# Patient Record
Sex: FEMALE | Race: BLACK OR AFRICAN AMERICAN | NOT HISPANIC OR LATINO
[De-identification: names, ages, dates, MRNs, and addresses within clinical notes are randomized per-mention and may not be internally consistent; named-entity substitution may affect disease eponyms.]

---

## 2019-06-17 PROBLEM — Z00.00 ENCOUNTER FOR PREVENTIVE HEALTH EXAMINATION: Status: ACTIVE | Noted: 2019-06-17

## 2019-06-20 ENCOUNTER — APPOINTMENT (OUTPATIENT)
Dept: ORTHOPEDIC SURGERY | Facility: CLINIC | Age: 54
End: 2019-06-20
Payer: COMMERCIAL

## 2019-06-20 VITALS — HEIGHT: 61 IN | BODY MASS INDEX: 39.65 KG/M2 | WEIGHT: 210 LBS

## 2019-06-20 PROCEDURE — 20610 DRAIN/INJ JOINT/BURSA W/O US: CPT | Mod: RT

## 2019-06-20 PROCEDURE — 99213 OFFICE O/P EST LOW 20 MIN: CPT | Mod: 25

## 2019-06-20 PROCEDURE — 73562 X-RAY EXAM OF KNEE 3: CPT | Mod: RT

## 2019-06-20 NOTE — HISTORY OF PRESENT ILLNESS
[de-identified] : Location: right knee\par Quality: aching\par Duration:06/4/2019\par Context: twisted knee , felt a pop\par Aggravating Factors: bending, stairs \par Conservative treatment: acebandage, ice, otc\par Associated Symptoms: twisting/tturning , popping \par Prior Studies:n.a\par

## 2019-06-20 NOTE — PHYSICAL EXAM
[de-identified] : Right knee shows no warmth there is mild swelling there is tenderness along the medial joint line. There is a negative Coleman test. No instability there is crepitus range of motion full extension and 90° of flexion. Neurovascular exam is normal [de-identified] : Right knee x-ray shows degenerative arthritis of the moderate degree

## 2019-06-20 NOTE — PROCEDURE
[de-identified] : Right knee cortisone injection was given with 40 mg of Kenalog and 3 cc 1% lidocaine

## 2019-06-20 NOTE — REVIEW OF SYSTEMS
[Arthralgia] : arthralgia [Joint Stiffness] : joint stiffness [Joint Pain] : joint pain [Joint Swelling] : joint swelling [Negative] : Psychiatric

## 2019-06-20 NOTE — DISCUSSION/SUMMARY
[Medication Risks Reviewed] : Medication risks reviewed [de-identified] : Injection was given into the right knee. She will also be placed on anti-inflammatory follow up in 2 weeks.

## 2019-06-24 ENCOUNTER — RX RENEWAL (OUTPATIENT)
Age: 54
End: 2019-06-24

## 2019-07-03 ENCOUNTER — APPOINTMENT (OUTPATIENT)
Dept: ORTHOPEDIC SURGERY | Facility: CLINIC | Age: 54
End: 2019-07-03

## 2019-07-10 ENCOUNTER — APPOINTMENT (OUTPATIENT)
Dept: ORTHOPEDIC SURGERY | Facility: CLINIC | Age: 54
End: 2019-07-10
Payer: MEDICAID

## 2019-07-10 PROCEDURE — 20610 DRAIN/INJ JOINT/BURSA W/O US: CPT | Mod: RT

## 2019-07-10 PROCEDURE — 99213 OFFICE O/P EST LOW 20 MIN: CPT | Mod: 25

## 2019-07-10 NOTE — DISCUSSION/SUMMARY
[de-identified] : Injection was given in the right knee. She will follow up in one month. She is better than she was.

## 2019-07-10 NOTE — PHYSICAL EXAM
[de-identified] : Right knee shows no warmth or swelling there is crepitus on range of motion his patellofemoral tenderness and mild medial lateral joint line tenderness. Negative Coleman neurovascular exam is normal

## 2019-07-10 NOTE — HISTORY OF PRESENT ILLNESS
[de-identified] : Right knee pain is better than it was. There is pain with physical activity. The anti-inflammatory helps. No buckling or locking.

## 2019-08-12 ENCOUNTER — APPOINTMENT (OUTPATIENT)
Dept: ORTHOPEDIC SURGERY | Facility: CLINIC | Age: 54
End: 2019-08-12

## 2019-09-20 ENCOUNTER — APPOINTMENT (OUTPATIENT)
Dept: ORTHOPEDIC SURGERY | Facility: CLINIC | Age: 54
End: 2019-09-20
Payer: COMMERCIAL

## 2019-09-20 PROCEDURE — 99213 OFFICE O/P EST LOW 20 MIN: CPT | Mod: 25

## 2019-09-20 PROCEDURE — 20610 DRAIN/INJ JOINT/BURSA W/O US: CPT | Mod: RT

## 2019-09-20 NOTE — DISCUSSION/SUMMARY
[de-identified] : Injection was given into the right knee. An effort of an MRI to make sure she doesn't have a meniscus tear also. In order gel injection also. Follow up by telephone with the MRI results in a week to gel and

## 2019-09-20 NOTE — HISTORY OF PRESENT ILLNESS
[de-identified] : Followup of her right knee. Injection lasted for a month. She elected to do it again. No buckling some giving way.

## 2019-09-20 NOTE — PHYSICAL EXAM
[LE] : Sensory: Intact in bilateral lower extremities [Normal RLE] : Right Lower Extremity: No scars, rashes, lesions, ulcers, skin intact [Normal Touch] : sensation intact for touch [Normal] : No swelling, no edema, normal pedal pulses and normal temperature [de-identified] : Right knee shows no warmth mild swelling there is varus stress crepitus there is joint line tenderness medially and laterally possible Coleman no instability neurovascular exam is

## 2019-12-23 ENCOUNTER — APPOINTMENT (OUTPATIENT)
Dept: ORTHOPEDIC SURGERY | Facility: CLINIC | Age: 54
End: 2019-12-23

## 2020-01-09 ENCOUNTER — APPOINTMENT (OUTPATIENT)
Dept: ORTHOPEDIC SURGERY | Facility: CLINIC | Age: 55
End: 2020-01-09
Payer: COMMERCIAL

## 2020-01-09 PROCEDURE — 99213 OFFICE O/P EST LOW 20 MIN: CPT

## 2020-01-09 NOTE — DISCUSSION/SUMMARY
[de-identified] : She will iceA neworder gel one for the right knee. The patient on an anti-inflammatory follow up will be in one month appear

## 2020-01-09 NOTE — PHYSICAL EXAM
[LE] : Sensory: Intact in bilateral lower extremities [Normal Touch] : sensation intact for touch [Normal RLE] : Right Lower Extremity: No scars, rashes, lesions, ulcers, skin intact [Normal] : No swelling, no edema, normal pedal pulses and normal temperature [de-identified] : Right knee shows no warmth mild swelling there is varus stress crepitus there is joint line tenderness medially and laterally possible Coleman no instability neurovascular exam is

## 2020-01-09 NOTE — HISTORY OF PRESENT ILLNESS
[Pain Location] : pain [Stable] : stable [de-identified] : She is here for followup. She did have a recent slip and fall about a month ago. The pain has improved slightly.

## 2020-02-06 ENCOUNTER — APPOINTMENT (OUTPATIENT)
Dept: ORTHOPEDIC SURGERY | Facility: CLINIC | Age: 55
End: 2020-02-06

## 2020-02-12 ENCOUNTER — APPOINTMENT (OUTPATIENT)
Dept: ORTHOPEDIC SURGERY | Facility: CLINIC | Age: 55
End: 2020-02-12
Payer: COMMERCIAL

## 2020-02-12 PROCEDURE — 20610 DRAIN/INJ JOINT/BURSA W/O US: CPT | Mod: RT

## 2020-02-12 PROCEDURE — 99213 OFFICE O/P EST LOW 20 MIN: CPT | Mod: 25

## 2020-02-12 NOTE — PHYSICAL EXAM
[LE] : Sensory: Intact in bilateral lower extremities [Normal RLE] : Right Lower Extremity: No scars, rashes, lesions, ulcers, skin intact [Normal Touch] : sensation intact for touch [Normal] : No swelling, no edema, normal pedal pulses and normal temperature [de-identified] : \par Right knee shows no warmth or swelling. There is crepitus on range of motion. There is joint line tenderness. There is no evidence of instability. Neurovascular exam is normal.

## 2020-02-12 NOTE — HISTORY OF PRESENT ILLNESS
[de-identified] : She is here for followup. she has recovered somewhat from her slip and fall injury. She is still working. We are awaiting the gel injection. the pain is somewhat manageable. [Pain Location] : pain [Stable] : stable

## 2020-02-12 NOTE — DISCUSSION/SUMMARY
[Medication Risks Reviewed] : Medication risks reviewed [de-identified] : Right knee cortisone injection given today. We will await the gel injection. I would try a different anti-inflammatory.

## 2020-06-17 ENCOUNTER — APPOINTMENT (OUTPATIENT)
Dept: ORTHOPEDIC SURGERY | Facility: CLINIC | Age: 55
End: 2020-06-17
Payer: COMMERCIAL

## 2020-06-17 VITALS — TEMPERATURE: 96 F

## 2020-06-17 PROCEDURE — 99213 OFFICE O/P EST LOW 20 MIN: CPT | Mod: 25

## 2020-06-17 PROCEDURE — 20610 DRAIN/INJ JOINT/BURSA W/O US: CPT | Mod: RT

## 2020-06-17 NOTE — PHYSICAL EXAM
[LE] : 5/5 motor strength in bilateral lower extremities [Normal Touch] : sensation intact for touch [Normal RLE] : Right Lower Extremity: No scars, rashes, lesions, ulcers, skin intact [Normal] : No swelling, no edema, normal pedal pulses and normal temperature [de-identified] : \par Right knee shows no warmth or swelling. There is crepitus on range of motion. There is joint line tenderness. There is no evidence of instability. Neurovascular exam is normal.

## 2020-06-17 NOTE — HISTORY OF PRESENT ILLNESS
[Pain Location] : pain [Stable] : stable [de-identified] : She was last seen in February. She had a cortisone injection it helped for a while. She took some naproxen. She would like to do it again. We still have not received the gel one injection

## 2020-10-19 ENCOUNTER — APPOINTMENT (OUTPATIENT)
Dept: ORTHOPEDIC SURGERY | Facility: CLINIC | Age: 55
End: 2020-10-19

## 2020-10-19 VITALS — TEMPERATURE: 95.1 F

## 2020-11-18 ENCOUNTER — APPOINTMENT (OUTPATIENT)
Dept: ORTHOPEDIC SURGERY | Facility: CLINIC | Age: 55
End: 2020-11-18
Payer: COMMERCIAL

## 2020-11-18 PROCEDURE — 99213 OFFICE O/P EST LOW 20 MIN: CPT | Mod: 25

## 2020-11-18 PROCEDURE — 20610 DRAIN/INJ JOINT/BURSA W/O US: CPT | Mod: RT

## 2020-11-18 NOTE — PHYSICAL EXAM
[LE] : Sensory: Intact in bilateral lower extremities [Normal RLE] : Right Lower Extremity: No scars, rashes, lesions, ulcers, skin intact [Normal Touch] : sensation intact for touch [Normal] : No swelling, no edema, normal pedal pulses and normal temperature [de-identified] : \par Right knee shows no warmth or swelling. There is crepitus on range of motion. There is joint line tenderness. There is no evidence of instability. Neurovascular exam is normal.

## 2020-11-18 NOTE — HISTORY OF PRESENT ILLNESS
[de-identified] : She was last seen in June. She had a cortisone injection it helped for a while. She took some naproxen. She would like to do it again. We still have not received the gel one injection and she will have to call the insurance to release it. [Pain Location] : pain [Stable] : stable

## 2020-11-30 ENCOUNTER — APPOINTMENT (OUTPATIENT)
Dept: ORTHOPEDIC SURGERY | Facility: CLINIC | Age: 55
End: 2020-11-30

## 2020-12-04 ENCOUNTER — APPOINTMENT (OUTPATIENT)
Dept: ORTHOPEDIC SURGERY | Facility: CLINIC | Age: 55
End: 2020-12-04
Payer: COMMERCIAL

## 2020-12-04 VITALS — TEMPERATURE: 95.7 F

## 2020-12-04 PROCEDURE — 20611 DRAIN/INJ JOINT/BURSA W/US: CPT | Mod: RT

## 2020-12-04 PROCEDURE — 99072 ADDL SUPL MATRL&STAF TM PHE: CPT

## 2020-12-04 PROCEDURE — 99214 OFFICE O/P EST MOD 30 MIN: CPT | Mod: 25

## 2020-12-04 NOTE — PROCEDURE
[de-identified] : Injection GEL ONE\par Patient was given an GEL ONE injection in the lateral compartment of the knee. Injection is performed under sterile conditions with ultrasound guidance. Patient tolerated procedure well. \par

## 2020-12-04 NOTE — HISTORY OF PRESENT ILLNESS
[de-identified] : pt is here for gel - one injection in the right knee. pt has done well with injections in the past

## 2020-12-04 NOTE — PHYSICAL EXAM
[de-identified] : \par Right knee shows no warmth or swelling. There is crepitus on range of motion. There is joint line tenderness. There is no evidence of instability. Neurovascular exam is normal. \par Musculoskeletal: Gait: normal. \par 5/5 motor strength in bilateral lower extremities. Sensory: Intact in bilateral lower extremities. \par Integumentary: Right Lower Extremity: No scars, rashes, lesions, ulcers, skin intact. \par Neurological: sensation intact for touch. \par Cardiovascular: No swelling, no edema, normal pedal pulses and normal temperature. \par  \par

## 2021-03-25 ENCOUNTER — APPOINTMENT (OUTPATIENT)
Dept: ORTHOPEDIC SURGERY | Facility: CLINIC | Age: 56
End: 2021-03-25
Payer: COMMERCIAL

## 2021-03-25 VITALS — HEIGHT: 61 IN | BODY MASS INDEX: 39.65 KG/M2 | WEIGHT: 210 LBS

## 2021-03-25 PROCEDURE — 99072 ADDL SUPL MATRL&STAF TM PHE: CPT

## 2021-03-25 PROCEDURE — 99213 OFFICE O/P EST LOW 20 MIN: CPT | Mod: 25

## 2021-03-25 PROCEDURE — 20610 DRAIN/INJ JOINT/BURSA W/O US: CPT | Mod: LT

## 2021-03-25 NOTE — PHYSICAL EXAM
[de-identified] : Left knee shows no obvious warmth or swelling. There is tenderness along the medial joint line. There is crepitus. There is no instability negative Coleman's neurovascular exam is normal

## 2021-03-25 NOTE — DISCUSSION/SUMMARY
[de-identified] : Cortisone injection given today no left knee. Postinjection instructions given. She would like to order gel injection for the left knee. We'll place the water. We have discussed this extensively. Followup will be after getting the medication

## 2021-03-25 NOTE — HISTORY OF PRESENT ILLNESS
[de-identified] : She is here for her left knee. She got relief with the right knee she had a gel injection. His pain going up and down stairs. No buckling or locking with the left knee [Pain Location] : pain [Worsening] : worsening

## 2021-06-24 ENCOUNTER — APPOINTMENT (OUTPATIENT)
Dept: ORTHOPEDIC SURGERY | Facility: CLINIC | Age: 56
End: 2021-06-24
Payer: COMMERCIAL

## 2021-06-24 VITALS — WEIGHT: 210 LBS | HEIGHT: 61 IN | BODY MASS INDEX: 39.65 KG/M2

## 2021-06-24 PROCEDURE — 99213 OFFICE O/P EST LOW 20 MIN: CPT | Mod: 25

## 2021-06-24 PROCEDURE — 20610 DRAIN/INJ JOINT/BURSA W/O US: CPT | Mod: 50

## 2021-06-24 NOTE — PHYSICAL EXAM
[de-identified] : Left knee shows no obvious warmth or swelling. There is tenderness along the medial joint line. There is crepitus. There is no instability negative Coleman's neurovascular exam is normal\par right knee shows no warmth or swelling. There is tenderness along the joint line. There is crepitus on range of motion. There is no evidence of instability. Neurovascular exam is normal. Negative Coleman.

## 2021-06-24 NOTE — DISCUSSION/SUMMARY
[Medication Risks Reviewed] : Medication risks reviewed [de-identified] : I have discussed shoe options. She would like cortisone injection. This was given. Postinjection instructions given. Followup will be as needed. She does not want a knee replacement at this time.

## 2021-06-24 NOTE — HISTORY OF PRESENT ILLNESS
[de-identified] : She is here for followup of both knees. She has osteoarthritis. She gets relief with the injection. She would like to do a new injection. She does not want to have total knee replacement done. [Pain Location] : pain [Stable] : stable

## 2021-06-24 NOTE — PROCEDURE
[de-identified] : After discussion of the risks and benefits, the patient has elected to proceed with an injection. Confirmed that the patient does not have a history of prior adverse reactions, active infections are relevant allergies. There was no erythema, warmth and the skin was clear. The skin was sterilized with alcohol. Ethyl chloride was used as a topical anesthetic. A needle was inserted. The site was injected with a mixture of Kenalog and local anesthetic. The injection was completed without complication and a bandage was applied. The patient tolerated the procedure well and was given post injection instructions.\par \par Medications:bilateral knee Kenalog 40 mg and 3 cc 1% lidocaine was given

## 2021-07-01 ENCOUNTER — APPOINTMENT (OUTPATIENT)
Dept: ORTHOPEDIC SURGERY | Facility: CLINIC | Age: 56
End: 2021-07-01

## 2022-04-13 ENCOUNTER — APPOINTMENT (OUTPATIENT)
Dept: ORTHOPEDIC SURGERY | Facility: CLINIC | Age: 57
End: 2022-04-13
Payer: COMMERCIAL

## 2022-04-13 PROCEDURE — 99214 OFFICE O/P EST MOD 30 MIN: CPT

## 2022-04-13 NOTE — PHYSICAL EXAM
[de-identified] : Physical Exam:\par \par General: patient is well developed, well nourished, in no acute \par distress, alert and oriented x 3. \par \par Mood and affect: normal\par \par Respiratory: no respiratory distress noted\par \par Skin: no scars over spine, skin intact, no erythema, increased warmth\par \par Alignment:The spine is well compensated in the coronal and sagittal plane.  \par \par Gait: The patient is able to toe walk and heel walk without difficulty. The patient is able to tandem gait without difficutly.\par \par Palpation: no tenderness to palpation spine or paraspinal region\par \par Range of motion: Lumbar spine ROM is restricted\par \par Neurologic Exam:\par Motor: Manual Muscle testing in the lower extremities is 5 out of 5 in all muscle groups. There is no evidence of muscular atrophy in the lower extremities. Sensory: Sensation to light touch is grossly intact in the lower extremities\par \par Reflexes: DTR are present and symmetric throughout, no clonus, plantar responses are flexor\par \par Hip Exam: No pain with internal or external rotation of hips bilaterally\par \par Special tests: Straight leg raise test negative.  Cross straight leg test negative.  PETER test negative\par \par Vascular: Examination of the peripheral vascular system demonstrates no evidence of congestion or edema. no evidence of lymphedema bilateral lower extremities, pulses are present and symmetric in both lower extremities. [de-identified] : XRay 4/13/22: L4-S1 moderate disc degeneration; no fractures; no instability; no significant scoliosis

## 2022-04-13 NOTE — HISTORY OF PRESENT ILLNESS
[de-identified] : 4-5 month history of lower back, non radiating, severe intensity\par treated with 2 months pt with mild relief\par tried naprosyn 500mg with mild relief, rx given by her MD\par current pain level 7/10\par non radiating, no numbness/tingling in lower extremities.  no weakness in the lower extremities\par no bladder/bowel symptoms\par no history of unexplained weight loss, no history of fevers/chills.\par \par ROS negative

## 2022-05-04 ENCOUNTER — APPOINTMENT (OUTPATIENT)
Dept: ORTHOPEDIC SURGERY | Facility: CLINIC | Age: 57
End: 2022-05-04

## 2022-05-05 RX ORDER — DIAZEPAM 5 MG/1
5 TABLET ORAL
Qty: 1 | Refills: 0 | Status: ACTIVE | COMMUNITY
Start: 2022-05-05 | End: 1900-01-01

## 2022-05-25 ENCOUNTER — APPOINTMENT (OUTPATIENT)
Dept: ORTHOPEDIC SURGERY | Facility: CLINIC | Age: 57
End: 2022-05-25
Payer: COMMERCIAL

## 2022-05-25 VITALS
DIASTOLIC BLOOD PRESSURE: 87 MMHG | HEART RATE: 69 BPM | OXYGEN SATURATION: 95 % | TEMPERATURE: 97.3 F | SYSTOLIC BLOOD PRESSURE: 144 MMHG

## 2022-05-25 PROCEDURE — 99214 OFFICE O/P EST MOD 30 MIN: CPT

## 2022-06-13 ENCOUNTER — APPOINTMENT (OUTPATIENT)
Dept: ORTHOPEDIC SURGERY | Facility: CLINIC | Age: 57
End: 2022-06-13
Payer: COMMERCIAL

## 2022-06-13 VITALS — WEIGHT: 242 LBS | BODY MASS INDEX: 45.69 KG/M2 | HEIGHT: 61 IN

## 2022-06-13 PROCEDURE — 99214 OFFICE O/P EST MOD 30 MIN: CPT | Mod: 25

## 2022-06-13 PROCEDURE — 20610 DRAIN/INJ JOINT/BURSA W/O US: CPT | Mod: 50

## 2022-06-13 RX ORDER — DICLOFENAC SODIUM 75 MG/1
75 TABLET, DELAYED RELEASE ORAL
Qty: 1 | Refills: 2 | Status: COMPLETED | COMMUNITY
Start: 2020-02-12 | End: 2022-06-13

## 2022-06-13 RX ORDER — ETODOLAC 500 MG/1
500 TABLET, FILM COATED ORAL TWICE DAILY
Qty: 60 | Refills: 4 | Status: COMPLETED | COMMUNITY
Start: 2020-01-09 | End: 2022-06-13

## 2022-06-13 RX ORDER — HYALURONATE SOD, CROSS-LINKED 30 MG/3 ML
30 SYRINGE (ML) INTRAARTICULAR
Qty: 1 | Refills: 0 | Status: COMPLETED | OUTPATIENT
Start: 2021-03-25 | End: 2022-06-13

## 2022-06-13 RX ORDER — HYALURONATE SOD, CROSS-LINKED 30 MG/3 ML
30 SYRINGE (ML) INTRAARTICULAR
Qty: 1 | Refills: 0 | Status: COMPLETED | OUTPATIENT
Start: 2020-06-17 | End: 2022-06-13

## 2022-06-13 RX ORDER — ETODOLAC 500 MG/1
500 TABLET, FILM COATED ORAL TWICE DAILY
Qty: 60 | Refills: 2 | Status: COMPLETED | COMMUNITY
Start: 2019-06-20 | End: 2022-06-13

## 2022-06-13 RX ORDER — ETODOLAC 500 MG/1
500 TABLET, FILM COATED ORAL TWICE DAILY
Qty: 60 | Refills: 2 | Status: COMPLETED | COMMUNITY
Start: 2019-06-24 | End: 2022-06-13

## 2022-06-13 RX ORDER — HYALURONATE SOD, CROSS-LINKED 30 MG/3 ML
30 SYRINGE (ML) INTRAARTICULAR
Qty: 1 | Refills: 0 | Status: COMPLETED | OUTPATIENT
Start: 2020-01-09 | End: 2022-06-13

## 2022-06-13 RX ORDER — HYALURONATE SOD, CROSS-LINKED 30 MG/3 ML
30 SYRINGE (ML) INTRAARTICULAR
Qty: 1 | Refills: 0 | Status: COMPLETED | OUTPATIENT
Start: 2019-09-20 | End: 2022-06-13

## 2022-06-13 RX ORDER — DIAZEPAM 5 MG/1
5 TABLET ORAL
Qty: 1 | Refills: 0 | Status: COMPLETED | COMMUNITY
Start: 2022-04-13 | End: 2022-06-13

## 2022-06-13 NOTE — PROCEDURE
[de-identified] : After discussion of the risks and benefits, the patient has elected to proceed with an injection. Confirmed that the patient does not have a history of prior adverse reactions, active infections are relevant allergies. There was no erythema, warmth and the skin was clear. The skin was sterilized with alcohol. Ethyl chloride was used as a topical anesthetic. A needle was inserted. The site was injected with a mixture of Kenalog and local anesthetic. The injection was completed without complication and a bandage was applied. The patient tolerated the procedure well and was given post injection instructions.\par \par Medications:bilateral knee Kenalog 40 mg and 3 cc 1% lidocaine was given

## 2022-06-13 NOTE — HISTORY OF PRESENT ILLNESS
[de-identified] : This patient is here for fractures bilateral knee osteoarthritis. She gets relief with cortisone injection. She doesn't want total knee replacement.

## 2022-06-13 NOTE — PHYSICAL EXAM
[de-identified] : Left knee shows no obvious warmth or swelling. There is tenderness along the medial joint line. There is crepitus. There is no instability negative Coleman's neurovascular exam is normal\par right knee shows no warmth or swelling. There is tenderness along the joint line. There is crepitus on range of motion. There is no evidence of instability. Neurovascular exam is normal. Negative Coleman.

## 2022-06-13 NOTE — DISCUSSION/SUMMARY
[de-identified] : Injection was given in both knees today. The patient tolerated. If the symptoms fail to improve she'll let me know. Total knee replacement discussed

## 2022-06-13 NOTE — REASON FOR VISIT
"Face to Face Note  -  Durable Medical Equipment    Ramsey Martin D.O. - NPI: [id number]  I certify that this patient is under my care and that they had a durable medical equipment(DME)face to face encounter by myself that meets the physician DME face-to-face encounter requirements with this patient on:    Date of encounter:   Patient:                    MRN:                       YOB: 2022  Florin Cox  [id number]  1980     The encounter with the patient was in whole, or in part, for the following medical condition, which is the primary reason for durable medical equipment:  Covid-19 Infection    I certify that, based on my findings, the following durable medical equipment is medically necessary:  Oxygen.    HOME O2 Saturation Measurements:(Values must be present for Home Oxygen orders)  Room air sat at rest: 91  Room air sat with amb: 90  With liters of O2: 3, O2 sat at rest with O2: 93  With Liters of O2: 3, O2 sat with amb with O2 : 93  Is the patient mobile?: Yes    My Clinical findings support the need for the above equipment due to:  Hypoxia    Supporting Symptoms: The patient requires supplemental oxygen, as the following interventions have been tried with limited or no improvement: \"Ambulation with oximetry    If patient feels more short of breath, they can go up to 6 liters per minute and contact healthcare provider.  " [Follow-Up Visit] : a follow-up visit for [Knee Pain] : knee pain [FreeTextEntry2] : bilateral knees dull pain and stiffness.

## 2022-06-14 NOTE — DISCUSSION/SUMMARY
[de-identified] : Patient was given a prescription and instructions to wear a TLSO brace at all times. She will take it off to bathe and sleep. She will follow up in six weeks with a new X-ray at that time. If she is not  significantly improved I would consider kyphoplasty at that time. If she is significantly improved I would recommend physical therapy at that time. At next visit she will obtain an AP lateral full spine X-ray standing. \par

## 2022-06-14 NOTE — ADDENDUM
[FreeTextEntry1] : I, Lorelei Montes, assisted with documentation on 05/25/2022 acting as scribe for Dr. Dave Berry.

## 2022-06-14 NOTE — HISTORY OF PRESENT ILLNESS
[de-identified] : Follow up 5/25/22: Claudette Morgan, here for follow up, continues to have significant back pain. This has not changed since her last visit. She had an MRI for lumbar spine to review. \par \par Initial 4/13/22: 4-5 month history of lower back, non radiating, severe intensity\par treated with 2 months pt with mild relief\par tried naprosyn 500mg with mild relief, rx given by her MD\par current pain level 7/10\par non radiating, no numbness/tingling in lower extremities.  no weakness in the lower extremities\par no bladder/bowel symptoms\par no history of unexplained weight loss, no history of fevers/chills.\par \par ROS negative

## 2022-06-14 NOTE — PHYSICAL EXAM
[de-identified] : Physical Exam:\par \par General: patient is well developed, well nourished, in no acute \par distress, alert and oriented x 3. \par \par Mood and affect: normal\par \par Respiratory: no respiratory distress noted\par \par Skin: no scars over spine, skin intact, no erythema, increased warmth\par \par Alignment:The spine is well compensated in the coronal and sagittal plane.  \par \par Gait: The patient is able to toe walk and heel walk without difficulty. The patient is able to tandem gait without difficutly.\par \par Palpation: no tenderness to palpation spine or paraspinal region\par \par Range of motion: Lumbar spine ROM is restricted\par \par Neurologic Exam:\par Motor: Manual Muscle testing in the lower extremities is 5 out of 5 in all muscle groups. There is no evidence of muscular atrophy in the lower extremities. Sensory: Sensation to light touch is grossly intact in the lower extremities\par \par Reflexes: DTR are present and symmetric throughout, no clonus, plantar responses are flexor\par \par Hip Exam: No pain with internal or external rotation of hips bilaterally\par \par Special tests: Straight leg raise test negative.  Cross straight leg test negative.  PETER test negative\par \par Vascular: Examination of the peripheral vascular system demonstrates no evidence of congestion or edema. no evidence of lymphedema bilateral lower extremities, pulses are present and symmetric in both lower extremities. [de-identified] : MRI lumbar spine [my read] 5/10/22: demonstrates acute compression fracture T12. There is also severe disc degenerative disease in L4/5. There is mild canal stenosis at that level. \par \par XRay 4/13/22: L4-S1 moderate disc degeneration; no fractures; no instability; no significant scoliosis

## 2022-06-24 NOTE — DISCUSSION/SUMMARY
[de-identified] : Chronic lower back pain after fall 5 months ago.  Moderate disc degeneration L4-S1 on xray.  failed PT course and Rx naprosyn course.  Recommend MRI lumbar spine non contrast.  valium 1 tab given for claustrophobia to be taken 1 hr before MRI.  follow up after MRI performed for review and determination of further plan.  all questions answered.
36.4 wk male born via CS to a 44 y/o  mother.  Maternal history of depression/anxiety. Prenatal history of GDM. Maternal labs include Blood Type O+, HIV - , RPR - , Rubella - , Hep B - , GBS unknown, not treated. COVID [PENDING]  AROM at delivery with clear fluids (ROM hours: 0). Baby emerged vigorous, crying, was w/d/s/s with APGARS of 9/9. Mom plans to initiate breastfeeding, consents Hep B vaccine and undecided on circ.  Highest maternal temp: 37, EOS n/a    Physical Exam:  Gen: no acute distress, +grimace  HEENT:  anterior fontanel open soft and flat, nondysmoprhic facies, no cleft lip/palate, ears normal set, no ear pits or tags, nares clinically patent  Resp: Normal respiratory effort without grunting or retractions, good air entry b/l, clear to auscultation bilaterally  Cardio: Present S1/S2, regular rate and rhythm, no murmurs  Abd: soft, non tender, non distended, umbilical cord with 3 vessels  Neuro: +palmar and plantar grasp, +suck, +tiki, normal tone  Extremities: negative shepherd and ortolani maneuvers, moving all extremities, no clavicular crepitus or stepoff  Skin: pink, warm  Genitals: Normal male anatomy, testicles palpable in scrotum b/l, Jj 1, anus patent

## 2022-07-06 ENCOUNTER — APPOINTMENT (OUTPATIENT)
Dept: ORTHOPEDIC SURGERY | Facility: CLINIC | Age: 57
End: 2022-07-06

## 2022-07-06 PROCEDURE — 99213 OFFICE O/P EST LOW 20 MIN: CPT

## 2022-07-06 PROCEDURE — 72082 X-RAY EXAM ENTIRE SPI 2/3 VW: CPT

## 2022-07-20 NOTE — ADDENDUM
[FreeTextEntry1] : I, Lorelei Montes, assisted with documentation on 07/06/2022 acting as scribe for Dr. Dave Berry.

## 2022-07-20 NOTE — PHYSICAL EXAM
[de-identified] : Physical Exam:\par \par General: patient is well developed, well nourished, in no acute \par distress, alert and oriented x 3. \par \par Mood and affect: normal\par \par Respiratory: no respiratory distress noted\par \par Skin: no scars over spine, skin intact, no erythema, increased warmth\par \par Alignment:The spine is well compensated in the coronal and sagittal plane.  \par \par Gait: The patient is able to toe walk and heel walk without difficulty. The patient is able to tandem gait without difficutly.\par \par Palpation: no tenderness to palpation spine or paraspinal region\par \par Range of motion: Lumbar spine ROM is deferred due to fracture\par \par Neurologic Exam:\par Motor: Manual Muscle testing in the lower extremities is 5 out of 5 in all muscle groups. There is no evidence of muscular atrophy in the lower extremities. Sensory: Sensation to light touch is grossly intact in the lower extremities\par \par Reflexes: DTR are present and symmetric throughout, no clonus, plantar responses are flexor\par \par Hip Exam: No pain with internal or external rotation of hips bilaterally\par \par Special tests: Straight leg raise test negative.  Cross straight leg test negative.  PETER test negative\par \par Vascular: Examination of the peripheral vascular system demonstrates no evidence of congestion or edema. no evidence of lymphedema bilateral lower extremities, pulses are present and symmetric in both lower extremities. [de-identified] : XR 7/6/22 (my read): T12 compression fracture, unchanged from 5/2022 mri\par \par MRI lumbar spine [my read] 5/10/22: demonstrates acute compression fracture T12. There is also severe disc degenerative disease in L4/5. There is mild canal stenosis at that level. \par \par XRay 4/13/22: L4-S1 moderate disc degeneration; no fractures; no instability; no significant scoliosis

## 2022-07-20 NOTE — DISCUSSION/SUMMARY
[de-identified] : Diagnosis: low back pain, improving; T12 compression fracture, stable on imaging today\par \par Discussed my findings with the patient. I am recommending a DEXA scan, order given. Will refer to endocrinology to optimize bone metabolism pending results of DEXA. Patient would also like to start physical therapy, ok given fracture now approximately 8 months old, referral given. Call office once DEXA has been completed for results. Follow up with me in 2-3 months, sooner if there is an issue. XR full spine at that time. All questions answered.

## 2022-07-20 NOTE — HISTORY OF PRESENT ILLNESS
[de-identified] : Follow up 7/6/22: Patient presents for follow up. She continues to have nonradiating mid and low back pain, mildly improved from last visit. Patient denies lower extremity numbness, weakness, or paresthesias. She was unable to obtain TLSO brace as her insurance did not cover and could not afford out-of-pocket cost. Denies new neurologic symptoms. Occasionally takes Naproxen for pain with relief. \par \par Follow up 5/25/22: Claudette Morgan, here for follow up, continues to have significant back pain. This has not changed since her last visit. She had an MRI for lumbar spine to review.\par \par Initial: 4-5 month history of lower back, non radiating, severe intensity\par treated with 2 months pt with mild relief\par tried naprosyn 500mg with mild relief, rx given by her MD\par current pain level 7/10\par non radiating, no numbness/tingling in lower extremities.  no weakness in the lower extremities\par no bladder/bowel symptoms\par no history of unexplained weight loss, no history of fevers/chills.\par \par ROS negative

## 2022-09-19 ENCOUNTER — APPOINTMENT (OUTPATIENT)
Dept: ORTHOPEDIC SURGERY | Facility: CLINIC | Age: 57
End: 2022-09-19

## 2022-09-19 DIAGNOSIS — M54.50 LOW BACK PAIN, UNSPECIFIED: ICD-10-CM

## 2022-09-19 DIAGNOSIS — G89.29 LOW BACK PAIN, UNSPECIFIED: ICD-10-CM

## 2022-09-19 PROCEDURE — 99213 OFFICE O/P EST LOW 20 MIN: CPT

## 2022-09-19 PROCEDURE — 72082 X-RAY EXAM ENTIRE SPI 2/3 VW: CPT

## 2022-09-19 RX ORDER — METOPROLOL SUCCINATE 25 MG/1
25 TABLET, EXTENDED RELEASE ORAL
Qty: 90 | Refills: 0 | Status: ACTIVE | COMMUNITY
Start: 2022-02-18

## 2022-09-19 RX ORDER — ROSUVASTATIN CALCIUM 10 MG/1
10 TABLET, FILM COATED ORAL
Qty: 30 | Refills: 0 | Status: ACTIVE | COMMUNITY
Start: 2022-05-03

## 2022-09-19 RX ORDER — AMLODIPINE BESYLATE 10 MG/1
10 TABLET ORAL
Qty: 90 | Refills: 0 | Status: ACTIVE | COMMUNITY
Start: 2022-05-03

## 2022-09-19 RX ORDER — FLUTICASONE FUROATE AND VILANTEROL TRIFENATATE 100; 25 UG/1; UG/1
100-25 POWDER RESPIRATORY (INHALATION)
Qty: 60 | Refills: 0 | Status: ACTIVE | COMMUNITY
Start: 2022-04-15

## 2022-09-19 RX ORDER — ALBUTEROL SULFATE 90 UG/1
108 (90 BASE) INHALANT RESPIRATORY (INHALATION)
Qty: 8 | Refills: 0 | Status: ACTIVE | COMMUNITY
Start: 2021-10-18

## 2022-09-19 RX ORDER — LOSARTAN POTASSIUM 50 MG/1
50 TABLET, FILM COATED ORAL
Qty: 30 | Refills: 0 | Status: ACTIVE | COMMUNITY
Start: 2022-07-11

## 2022-09-19 RX ORDER — AZELASTINE HYDROCHLORIDE 137 UG/1
0.1 SPRAY, METERED NASAL
Qty: 30 | Refills: 0 | Status: ACTIVE | COMMUNITY
Start: 2022-04-15

## 2022-09-19 RX ORDER — MONTELUKAST 10 MG/1
10 TABLET, FILM COATED ORAL
Qty: 30 | Refills: 0 | Status: ACTIVE | COMMUNITY
Start: 2022-07-11

## 2022-09-19 RX ORDER — NAPROXEN 500 MG/1
500 TABLET ORAL
Qty: 30 | Refills: 0 | Status: ACTIVE | COMMUNITY
Start: 2022-07-11

## 2022-09-19 NOTE — DISCUSSION/SUMMARY
[de-identified] : Diagnosis: low back pain, T12 compression fracture aprox 10 months ago, stable on imaging today\par \par Discussed my findings with the patient. I am again recommending a DEXA scan, order given. Will refer to endocrinology to optimize bone metabolism pending results of DEXA. Patient would also like to restart physical therapy, ok given fracture now approximately 10 months old, referral given. Call office once DEXA has been completed for results. Follow up with me in 3 months, sooner if there is an issue. XR full spine at that time. If still having significant pain would consider repeat MRI to ensure fracture healed.  All questions answered.

## 2022-09-19 NOTE — HISTORY OF PRESENT ILLNESS
[de-identified] : Follow up 9/19/22: here for follow up.  fell in july after visit again and had increased pain, now feels back to where she was at last visit.  some days will wake up with 8-10/10 low back pain, some days will wake up without pain.  feels pain intermittently throughout the day but not every day.  Was unable to do PT due to insurance reasons for past few months.  would like to start again as it made her feel better.  no radiation of pain, no numbness/tingling/weakness.\par \par Follow up 7/6/22: Patient presents for follow up. She continues to have nonradiating mid and low back pain, mildly improved from last visit. Patient denies lower extremity numbness, weakness, or paresthesias. She was unable to obtain TLSO brace as her insurance did not cover and could not afford out-of-pocket cost. Denies new neurologic symptoms. Occasionally takes Naproxen for pain with relief. \par \par Follow up 5/25/22: Claudette Morgan, here for follow up, continues to have significant back pain. This has not changed since her last visit. She had an MRI for lumbar spine to review.\par \par Initial: 4-5 month history of lower back, non radiating, severe intensity\par treated with 2 months pt with mild relief\par tried naprosyn 500mg with mild relief, rx given by her MD\par current pain level 7/10\par non radiating, no numbness/tingling in lower extremities. no weakness in the lower extremities\par no bladder/bowel symptoms\par no history of unexplained weight loss, no history of fevers/chills.\par \par ROS negative \par

## 2022-09-19 NOTE — PHYSICAL EXAM
[de-identified] : Physical Exam:\par \par General: patient is well developed, well nourished, in no acute \par distress, alert and oriented x 3. \par \par Mood and affect: normal\par \par Respiratory: no respiratory distress noted\par \par Skin: no scars over spine, skin intact, no erythema, increased warmth\par \par Alignment:The spine is well compensated in the coronal and sagittal plane.  \par \par Gait: The patient is able to toe walk and heel walk without difficulty. The patient is able to tandem gait without difficutly.\par \par Palpation: no tenderness to palpation spine or paraspinal region\par \par Range of motion: Lumbar spine ROM is restricted\par \par Neurologic Exam:\par Motor: Manual Muscle testing in the lower extremities is 5 out of 5 in all muscle groups. There is no evidence of muscular atrophy in the lower extremities. Sensory: Sensation to light touch is grossly intact in the lower extremities\par \par Reflexes: DTR are present and symmetric throughout, no clonus, plantar responses are flexor\par \par Hip Exam: No pain with internal or external rotation of hips bilaterally\par \par Special tests: Straight leg raise test negative.  Cross straight leg test negative.  PETER test negative\par \par Vascular: Examination of the peripheral vascular system demonstrates no evidence of congestion or edema. no evidence of lymphedema bilateral lower extremities, pulses are present and symmetric in both lower extremities. [de-identified] : XR 9/19/22 (my read) T12 compression fracture, unchanged\par \par XR 7/6/22 (my read): T12 compression fracture, unchanged from 5/2022 mri\par \par MRI lumbar spine [my read] 5/10/22: demonstrates acute compression fracture T12. There is also severe disc degenerative disease in L4/5. There is mild canal stenosis at that level. \par \par XRay 4/13/22: L4-S1 moderate disc degeneration; no fractures; no instability; no significant scoliosis.

## 2022-09-27 ENCOUNTER — APPOINTMENT (OUTPATIENT)
Dept: ORTHOPEDIC SURGERY | Facility: CLINIC | Age: 57
End: 2022-09-27

## 2022-10-04 ENCOUNTER — APPOINTMENT (OUTPATIENT)
Dept: RADIOLOGY | Facility: HOSPITAL | Age: 57
End: 2022-10-04

## 2022-10-04 ENCOUNTER — OUTPATIENT (OUTPATIENT)
Dept: OUTPATIENT SERVICES | Facility: HOSPITAL | Age: 57
LOS: 1 days | End: 2022-10-04
Payer: COMMERCIAL

## 2022-10-04 PROCEDURE — 77080 DXA BONE DENSITY AXIAL: CPT | Mod: 26

## 2022-10-04 PROCEDURE — 77080 DXA BONE DENSITY AXIAL: CPT

## 2022-10-05 ENCOUNTER — NON-APPOINTMENT (OUTPATIENT)
Age: 57
End: 2022-10-05

## 2022-11-10 ENCOUNTER — APPOINTMENT (OUTPATIENT)
Dept: ORTHOPEDIC SURGERY | Facility: CLINIC | Age: 57
End: 2022-11-10

## 2022-11-10 PROCEDURE — 20610 DRAIN/INJ JOINT/BURSA W/O US: CPT | Mod: 50

## 2022-11-10 NOTE — DISCUSSION/SUMMARY
[de-identified] : Injection was done today Kenalog into each knee postinjection instructions given follow up will be as needed.

## 2022-11-10 NOTE — HISTORY OF PRESENT ILLNESS
[de-identified] : This patient has bilateral knee osteoarthritis she's got relief with cortisone injections. She was seen quite some time ago. She would like to do some injections today. She did fall in July.

## 2022-11-10 NOTE — PHYSICAL EXAM
[de-identified] : Left knee shows no obvious warmth or swelling. There is tenderness along the medial joint line. There is crepitus. There is no instability negative Coleman's neurovascular exam is normal\par right knee shows no warmth or swelling. There is tenderness along the joint line. There is crepitus on range of motion. There is no evidence of instability. Neurovascular exam is normal. Negative Coleman.

## 2022-11-10 NOTE — PROCEDURE
[de-identified] : After discussion of the risks and benefits, the patient has elected to proceed with an injection. Confirmed that the patient does not have a history of prior adverse reactions, active infections are relevant allergies. There was no erythema, warmth and the skin was clear. The skin was sterilized with alcohol. Ethyl chloride was used as a topical anesthetic. A needle was inserted. The site was injected with a mixture of Kenalog and local anesthetic. The injection was completed without complication and a bandage was applied. The patient tolerated the procedure well and was given post injection instructions.\par \par Medications:bilateral knee Kenalog 10 mg and 3 cc 1% lidocaine was given

## 2022-11-14 RX ORDER — HYALURONATE SOD, CROSS-LINKED 30 MG/3 ML
30 SYRINGE (ML) INTRAARTICULAR
Qty: 1 | Refills: 0 | Status: ACTIVE | OUTPATIENT
Start: 2022-11-14

## 2022-12-28 ENCOUNTER — APPOINTMENT (OUTPATIENT)
Dept: ORTHOPEDIC SURGERY | Facility: CLINIC | Age: 57
End: 2022-12-28
Payer: COMMERCIAL

## 2022-12-28 VITALS
BODY MASS INDEX: 45.69 KG/M2 | DIASTOLIC BLOOD PRESSURE: 95 MMHG | SYSTOLIC BLOOD PRESSURE: 138 MMHG | WEIGHT: 242 LBS | HEIGHT: 61 IN | OXYGEN SATURATION: 97 % | HEART RATE: 93 BPM

## 2022-12-28 DIAGNOSIS — M51.36 OTHER INTERVERTEBRAL DISC DEGENERATION, LUMBAR REGION: ICD-10-CM

## 2022-12-28 PROCEDURE — 99213 OFFICE O/P EST LOW 20 MIN: CPT

## 2022-12-28 PROCEDURE — 72082 X-RAY EXAM ENTIRE SPI 2/3 VW: CPT

## 2023-01-09 NOTE — DISCUSSION/SUMMARY
[de-identified] : Diagnosis: Healed T12 fracture, L4-S1 disc degeneration, chronic mid and lower back pain.\par \par I have discussed my findings with the patient. I have recommended a course of physical therapy again. The patient would like to go now. She should follow up with me on an as needed basis.

## 2023-01-09 NOTE — PHYSICAL EXAM
[de-identified] : Physical Exam:\par \par General: patient is well developed, well nourished, in no acute \par distress, alert and oriented x 3. \par \par Mood and affect: normal\par \par Respiratory: no respiratory distress noted\par \par Skin: no scars over spine, skin intact, no erythema, increased warmth\par \par Alignment:The spine is well compensated in the coronal and sagittal plane.  \par \par Gait: The patient is able to toe walk and heel walk without difficulty. The patient is able to tandem gait without difficutly.\par \par Palpation: no tenderness to palpation spine or paraspinal region\par \par Range of motion: Lumbar spine ROM is restricted\par \par Neurologic Exam:\par Motor: Manual Muscle testing in the lower extremities is 5 out of 5 in all muscle groups. There is no evidence of muscular atrophy in the lower extremities. Sensory: Sensation to light touch is grossly intact in the lower extremities\par \par Reflexes: DTR are present and symmetric throughout, no clonus, plantar responses are flexor\par \par Hip Exam: No pain with internal or external rotation of hips bilaterally\par \par Special tests: Straight leg raise test negative.  Cross straight leg test negative.  PETER test negative\par \par Vascular: Examination of the peripheral vascular system demonstrates no evidence of congestion or edema. no evidence of lymphedema bilateral lower extremities, pulses are present and symmetric in both lower extremities. [de-identified] : XR Full Spine AP/Lateral 12/28/2022 [my read]: Healed T12 fracture with normal alignment in that region.\par \par XR 9/19/22 (my read) T12 compression fracture, unchanged\par \par XR 7/6/22 (my read): T12 compression fracture, unchanged from 5/2022 mri\par \par MRI lumbar spine [my read] 5/10/22: demonstrates acute compression fracture T12. There is also severe disc degenerative disease in L4/5. There is mild canal stenosis at that level. \par \par XRay 4/13/22: L4-S1 moderate disc degeneration; no fractures; no instability; no significant scoliosis.

## 2023-01-09 NOTE — HISTORY OF PRESENT ILLNESS
[de-identified] : Follow up 12/28/2022: Ms. Jc is here for follow up. She continues to have low back pain that is nonradiating. She did not go to physical therapy on her last visit due to insurance issues. She says those issues have been worked out and would like to start physical therapy.\par \par Follow up 9/19/22: here for follow up.  fell in july after visit again and had increased pain, now feels back to where she was at last visit.  some days will wake up with 8-10/10 low back pain, some days will wake up without pain.  feels pain intermittently throughout the day but not every day.  Was unable to do PT due to insurance reasons for past few months.  would like to start again as it made her feel better.  no radiation of pain, no numbness/tingling/weakness.\par \par Follow up 7/6/22: Patient presents for follow up. She continues to have nonradiating mid and low back pain, mildly improved from last visit. Patient denies lower extremity numbness, weakness, or paresthesias. She was unable to obtain TLSO brace as her insurance did not cover and could not afford out-of-pocket cost. Denies new neurologic symptoms. Occasionally takes Naproxen for pain with relief. \par \par Follow up 5/25/22: Claudette Morgan, here for follow up, continues to have significant back pain. This has not changed since her last visit. She had an MRI for lumbar spine to review.\par \par Initial: 4-5 month history of lower back, non radiating, severe intensity\par treated with 2 months pt with mild relief\par tried naprosyn 500mg with mild relief, rx given by her MD\par current pain level 7/10\par non radiating, no numbness/tingling in lower extremities. no weakness in the lower extremities\par no bladder/bowel symptoms\par no history of unexplained weight loss, no history of fevers/chills.\par \par ROS negative \par

## 2023-01-09 NOTE — END OF VISIT
[FreeTextEntry3] : All medical record entries made by the Scribe were at my, Dr. Dave Berry, direction and personally dictated by me on 12/28/2022. I have reviewed the chart and agree that the record accurately reflects my personal performance of the history, physical exam, assessment and plan. I have also personally directed, reviewed, and agreed with the chart.

## 2023-03-21 ENCOUNTER — APPOINTMENT (OUTPATIENT)
Dept: ORTHOPEDIC SURGERY | Facility: CLINIC | Age: 58
End: 2023-03-21
Payer: COMMERCIAL

## 2023-03-21 VITALS — BODY MASS INDEX: 39.65 KG/M2 | WEIGHT: 210 LBS | HEIGHT: 61 IN

## 2023-03-21 PROCEDURE — 99213 OFFICE O/P EST LOW 20 MIN: CPT | Mod: 25

## 2023-03-21 PROCEDURE — 20611 DRAIN/INJ JOINT/BURSA W/US: CPT | Mod: LT

## 2023-03-22 RX ORDER — HYALURONATE SODIUM, STABILIZED 88 MG/4 ML
88 SYRINGE (ML) INTRAARTICULAR
Qty: 1 | Refills: 0 | Status: ACTIVE | OUTPATIENT
Start: 2023-03-22

## 2023-03-22 NOTE — ASSESSMENT
[FreeTextEntry1] : Discussed at length with patient regarding symptoms and diagnosis.  Treatment options discussed and patient's questions answered and patient expresses understanding.  Patient elects MONOVISC to right knee and would like to order for the LEFT.  Patient instructed to call if any questions or concerns.\par

## 2023-03-22 NOTE — PROCEDURE
[de-identified] : After discussion of the risks and benefits, the patient elects Monovisc injection to the knee.  Confirmed that the patient does not have history of prior adverse reactions, active infections, or relevant allergies.  There was no effusion, erythema, or warmth, and the skin was clear.\par The skin was prepped in the usual sterile manner, ethyl chloride spray was used as a topical anesthetic.  A needle was inserted \par UTILIZING ULTRASOUND GUIDANCE TO LOCALIZE THE NEEDLE IN THE KNEE JOINT\par into the RIGHT KNEE via an anterolateral approach.  Viscosupplement was then slowly injected. The injection was completed without complication and a bandage was applied.\par The patient tolerated the procedure well and was given post-injection instructions: no exercise x 48 hours, cold packs and analgesics prn.\par

## 2023-03-22 NOTE — HISTORY OF PRESENT ILLNESS
[de-identified] : Initial Visit for B/L Knees Right Knee more than Left \par GEL Inj today \par LAURA from Dr. Kingsley - last visit 11/10/2022\par Hx of Cortisone Inj - not as helpful due to job duties standing for prolonged time outside\par Hx of Arthritis at B/L Knees\par Reason: Denies Injury / Trauma\par Duration: 5 years ago \par Aggrvt: Stair climbing, prolonged walking\par Allvt: Heat \par NKDA\par Pain Med: Naproxen (Prescribed by PCP)

## 2023-03-22 NOTE — PHYSICAL EXAM
[de-identified] : Bilateral knee\par \par Constitutional: \par The patient is healthy-appearing and in no apparent distress. \par \par Gait:\par The patient ambulates with a normal gait and no limp.\par \par Cardiovascular System: \par The capillary refill is less than 2 seconds. \par \par Skin: \par There are no skin abnormalities.\par \par Bilateral Knee:\par  \par Bony Palpation: \par There is no tenderness of the medial joint line. \par There is no tenderness of the lateral joint line.\par There is tenderness of the medial femoral chondyle.\par There is tenderness of the lateral femoral chondyle.\par There is no tenderness of the tibial tubercle.\par There is no tenderness of the superior patella.\par There is no tenderness of the inferior patella.\par There is tenderness of the medial patellar facet.\par There is tenderness of the lateral patellar facet.\par \par Soft Tissue Palpation: \par There is no tenderness of the medial retinaculum.\par There is no tenderness of the lateral retinaculum.\par There is no tenderness of the quadriceps tendon.\par There is no tenderness of the patella tendon.\par There is no tenderness of the ITB.\par There is no tenderness of the pes anserine.\par \par Active Range of Motion: \par The range of motion at the knee actively and passively is 3 - 125. \par \par Special Tests: \par There is a negative Apley.\par There is a negative Steinmanns. \par There is a negative Lachman and Anterior Drawer.\par There is a negative Posterior Drawer.  \par There is no varus or valgus laxity.\par \par Strength: \par There is 5/5 hip flexion and 5/5 knee flexion and extension.  \par \par Psychiatric: \par The patient demonstrates a normal mood and affect and is active and alert.

## 2023-04-03 ENCOUNTER — APPOINTMENT (OUTPATIENT)
Dept: ORTHOPEDIC SURGERY | Facility: CLINIC | Age: 58
End: 2023-04-03
Payer: COMMERCIAL

## 2023-04-03 PROCEDURE — 99213 OFFICE O/P EST LOW 20 MIN: CPT | Mod: 25

## 2023-04-03 PROCEDURE — 20610 DRAIN/INJ JOINT/BURSA W/O US: CPT | Mod: LT

## 2023-04-03 NOTE — PHYSICAL EXAM
[de-identified] : Bilateral knee\par \par Constitutional: \par The patient is healthy-appearing and in no apparent distress. \par \par Gait:\par The patient ambulates with a normal gait and no limp.\par \par Cardiovascular System: \par The capillary refill is less than 2 seconds. \par \par Skin: \par There are no skin abnormalities.\par \par Bilateral Knee:\par  \par Bony Palpation: \par There is no tenderness of the medial joint line. \par There is no tenderness of the lateral joint line.\par There is tenderness of the medial femoral chondyle.\par There is tenderness of the lateral femoral chondyle.\par There is no tenderness of the tibial tubercle.\par There is no tenderness of the superior patella.\par There is no tenderness of the inferior patella.\par There is tenderness of the medial patellar facet.\par There is tenderness of the lateral patellar facet.\par \par Soft Tissue Palpation: \par There is no tenderness of the medial retinaculum.\par There is no tenderness of the lateral retinaculum.\par There is no tenderness of the quadriceps tendon.\par There is no tenderness of the patella tendon.\par There is no tenderness of the ITB.\par There is no tenderness of the pes anserine.\par \par Active Range of Motion: \par The range of motion at the knee actively and passively is 3 - 125. \par \par Special Tests: \par There is a negative Apley.\par There is a negative Steinmanns. \par There is a negative Lachman and Anterior Drawer.\par There is a negative Posterior Drawer.  \par There is no varus or valgus laxity.\par \par Strength: \par There is 5/5 hip flexion and 5/5 knee flexion and extension.  \par \par Psychiatric: \par The patient demonstrates a normal mood and affect and is active and alert.

## 2023-04-03 NOTE — PROCEDURE

## 2023-04-03 NOTE — ASSESSMENT
[FreeTextEntry1] : Discussed at length the patient underlying arthritis at this time and she elects injection to the left knee and I informed her again that it is too soon to make a determination on the right knee as it does take 6 weeks for medication to its effects

## 2023-04-03 NOTE — HISTORY OF PRESENT ILLNESS
[de-identified] : Follow up on B/L Knees\par 8/10 Pain Level\par Last Visit: 03/21/2023 - Monovisc Gel Inj - Unsure if helpful\par

## 2023-08-15 ENCOUNTER — APPOINTMENT (OUTPATIENT)
Dept: ORTHOPEDIC SURGERY | Facility: CLINIC | Age: 58
End: 2023-08-15
Payer: COMMERCIAL

## 2023-08-15 PROCEDURE — 20610 DRAIN/INJ JOINT/BURSA W/O US: CPT | Mod: 50

## 2023-08-15 PROCEDURE — 99213 OFFICE O/P EST LOW 20 MIN: CPT | Mod: 25

## 2023-08-18 NOTE — HISTORY OF PRESENT ILLNESS
[de-identified] : Follow up on Bilateral Knees Last Visit: 04/03/2023  Pain Level: 7-8/10 Symptoms: Swelling at Right Knee / Soreness at Left Knee

## 2023-08-18 NOTE — PROCEDURE

## 2023-08-18 NOTE — ASSESSMENT
[FreeTextEntry1] : Discussed at length the patient underlying arthritis at this time and she elects injections at this time.

## 2023-08-18 NOTE — PHYSICAL EXAM
[de-identified] : Bilateral knee\par  \par  Constitutional: \par  The patient is healthy-appearing and in no apparent distress. \par  \par  Gait:\par  The patient ambulates with a normal gait and no limp.\par  \par  Cardiovascular System: \par  The capillary refill is less than 2 seconds. \par  \par  Skin: \par  There are no skin abnormalities.\par  \par  Bilateral Knee:\par   \par  Bony Palpation: \par  There is no tenderness of the medial joint line. \par  There is no tenderness of the lateral joint line.\par  There is tenderness of the medial femoral chondyle.\par  There is tenderness of the lateral femoral chondyle.\par  There is no tenderness of the tibial tubercle.\par  There is no tenderness of the superior patella.\par  There is no tenderness of the inferior patella.\par  There is tenderness of the medial patellar facet.\par  There is tenderness of the lateral patellar facet.\par  \par  Soft Tissue Palpation: \par  There is no tenderness of the medial retinaculum.\par  There is no tenderness of the lateral retinaculum.\par  There is no tenderness of the quadriceps tendon.\par  There is no tenderness of the patella tendon.\par  There is no tenderness of the ITB.\par  There is no tenderness of the pes anserine.\par  \par  Active Range of Motion: \par  The range of motion at the knee actively and passively is 3 - 125. \par  \par  Special Tests: \par  There is a negative Apley.\par  There is a negative Steinmanns. \par  There is a negative Lachman and Anterior Drawer.\par  There is a negative Posterior Drawer.  \par  There is no varus or valgus laxity.\par  \par  Strength: \par  There is 5/5 hip flexion and 5/5 knee flexion and extension.  \par  \par  Psychiatric: \par  The patient demonstrates a normal mood and affect and is active and alert.

## 2023-12-27 ENCOUNTER — APPOINTMENT (OUTPATIENT)
Dept: ORTHOPEDIC SURGERY | Facility: CLINIC | Age: 58
End: 2023-12-27
Payer: COMMERCIAL

## 2023-12-27 DIAGNOSIS — M17.11 UNILATERAL PRIMARY OSTEOARTHRITIS, RIGHT KNEE: ICD-10-CM

## 2023-12-27 DIAGNOSIS — M17.12 UNILATERAL PRIMARY OSTEOARTHRITIS, LEFT KNEE: ICD-10-CM

## 2023-12-27 PROCEDURE — 99213 OFFICE O/P EST LOW 20 MIN: CPT | Mod: 25

## 2023-12-27 PROCEDURE — 20610 DRAIN/INJ JOINT/BURSA W/O US: CPT | Mod: 50

## 2023-12-28 PROBLEM — M17.12 OSTEOARTHROSIS, LOCALIZED, PRIMARY, KNEE, LEFT: Status: ACTIVE | Noted: 2021-03-25

## 2023-12-28 PROBLEM — M17.11 OSTEOARTHROSIS, LOCALIZED, PRIMARY, KNEE, RIGHT: Status: ACTIVE | Noted: 2019-06-20

## 2023-12-28 NOTE — PHYSICAL EXAM
[de-identified] : Bilateral knee\par  \par  Constitutional: \par  The patient is healthy-appearing and in no apparent distress. \par  \par  Gait:\par  The patient ambulates with a normal gait and no limp.\par  \par  Cardiovascular System: \par  The capillary refill is less than 2 seconds. \par  \par  Skin: \par  There are no skin abnormalities.\par  \par  Bilateral Knee:\par   \par  Bony Palpation: \par  There is no tenderness of the medial joint line. \par  There is no tenderness of the lateral joint line.\par  There is tenderness of the medial femoral chondyle.\par  There is tenderness of the lateral femoral chondyle.\par  There is no tenderness of the tibial tubercle.\par  There is no tenderness of the superior patella.\par  There is no tenderness of the inferior patella.\par  There is tenderness of the medial patellar facet.\par  There is tenderness of the lateral patellar facet.\par  \par  Soft Tissue Palpation: \par  There is no tenderness of the medial retinaculum.\par  There is no tenderness of the lateral retinaculum.\par  There is no tenderness of the quadriceps tendon.\par  There is no tenderness of the patella tendon.\par  There is no tenderness of the ITB.\par  There is no tenderness of the pes anserine.\par  \par  Active Range of Motion: \par  The range of motion at the knee actively and passively is 3 - 125. \par  \par  Special Tests: \par  There is a negative Apley.\par  There is a negative Steinmanns. \par  There is a negative Lachman and Anterior Drawer.\par  There is a negative Posterior Drawer.  \par  There is no varus or valgus laxity.\par  \par  Strength: \par  There is 5/5 hip flexion and 5/5 knee flexion and extension.  \par  \par  Psychiatric: \par  The patient demonstrates a normal mood and affect and is active and alert.

## 2023-12-28 NOTE — PROCEDURE

## 2023-12-28 NOTE — REASON FOR VISIT
[Follow-Up Visit] : a follow-up visit for [Knee Pain] : knee pain [FreeTextEntry2] : JENNIFER knee cortisone injection

## 2023-12-28 NOTE — HISTORY OF PRESENT ILLNESS
[de-identified] : Patient is an established patient with bilateral knee arthritis presenting for further treatment discussion and options

## 2023-12-28 NOTE — ASSESSMENT
[FreeTextEntry1] : Discussed with patient underlying arthritis and treatment options at this time she elects cortisone injection and will follow-up on an as-needed basis

## 2024-01-03 ENCOUNTER — APPOINTMENT (OUTPATIENT)
Dept: ORTHOPEDIC SURGERY | Facility: CLINIC | Age: 59
End: 2024-01-03
Payer: COMMERCIAL

## 2024-01-03 DIAGNOSIS — S22.080A WEDGE COMPRESSION FRACTURE OF T11-T12 VERTEBRA, INITIAL ENCOUNTER FOR CLOSED FRACTURE: ICD-10-CM

## 2024-01-03 PROCEDURE — 72083 X-RAY EXAM ENTIRE SPI 4/5 VW: CPT

## 2024-01-04 PROBLEM — S22.080A CLOSED WEDGE COMPRESSION FRACTURE OF T12 VERTEBRA: Status: ACTIVE | Noted: 2022-05-25

## 2024-01-04 NOTE — HISTORY OF PRESENT ILLNESS
[de-identified] : [birth year] 58F presents for follow up. She was last seen approximately 1 year ago. She reports during that time was having intermittent non-radiating lobectomy but was well managed with conservative measures. She suffered a fall from standing November 2023. She was able to catch herself in her hands but she reports an exacerbation of bilateral non-radiating low back pain after her fall. She's been using heat and taking naproxen intermittently. She reports she's almost back to baseline. She denies any new neurologic symptoms.  Follow up 12/28/2022: Ms. Jc is here for follow up. She continues to have low back pain that is nonradiating. She did not go to physical therapy on her last visit due to insurance issues. She says those issues have been worked out and would like to start physical therapy.  Follow up 9/19/22: here for follow up.  fell in july after visit again and had increased pain, now feels back to where she was at last visit.  some days will wake up with 8-10/10 low back pain, some days will wake up without pain.  feels pain intermittently throughout the day but not every day.  Was unable to do PT due to insurance reasons for past few months.  would like to start again as it made her feel better.  no radiation of pain, no numbness/tingling/weakness.  Follow up 7/6/22: Patient presents for follow up. She continues to have nonradiating mid and low back pain, mildly improved from last visit. Patient denies lower extremity numbness, weakness, or paresthesias. She was unable to obtain TLSO brace as her insurance did not cover and could not afford out-of-pocket cost. Denies new neurologic symptoms. Occasionally takes Naproxen for pain with relief.   Follow up 5/25/22: Claudette Morgan, here for follow up, continues to have significant back pain. This has not changed since her last visit. She had an MRI for lumbar spine to review.  Initial: 4-5 month history of lower back, non radiating, severe intensity treated with 2 months pt with mild relief tried naprosyn 500mg with mild relief, rx given by her MD current pain level 7/10 non radiating, no numbness/tingling in lower extremities. no weakness in the lower extremities no bladder/bowel symptoms no history of unexplained weight loss, no history of fevers/chills.  ROS negative

## 2024-01-04 NOTE — DISCUSSION/SUMMARY
[de-identified] : T12 compression fracture.  No additional fracture seen. I'm recommending a course of physical therapy and an order was given. She will follow up with me on an as needed basis. All questions answered.

## 2024-01-04 NOTE — END OF VISIT
[FreeTextEntry3] :  All medical record entries made by the Scribe were at my, Dr. Dave Berry, direction and personally dictated by me on 01/03/2024. I have reviewed the chart and agree that the record accurately reflects my personal performance of the history, physical exam, assessment and plan. I have also personally directed, reviewed, and agreed with the chart.

## 2024-01-04 NOTE — PHYSICAL EXAM
[de-identified] : Physical Exam:\par  \par  General: patient is well developed, well nourished, in no acute \par  distress, alert and oriented x 3. \par  \par  Mood and affect: normal\par  \par  Respiratory: no respiratory distress noted\par  \par  Skin: no scars over spine, skin intact, no erythema, increased warmth\par  \par  Alignment:The spine is well compensated in the coronal and sagittal plane.  \par  \par  Gait: The patient is able to toe walk and heel walk without difficulty. The patient is able to tandem gait without difficutly.\par  \par  Palpation: no tenderness to palpation spine or paraspinal region\par  \par  Range of motion: Lumbar spine ROM is restricted\par  \par  Neurologic Exam:\par  Motor: Manual Muscle testing in the lower extremities is 5 out of 5 in all muscle groups. There is no evidence of muscular atrophy in the lower extremities. Sensory: Sensation to light touch is grossly intact in the lower extremities\par  \par  Reflexes: DTR are present and symmetric throughout, no clonus, plantar responses are flexor\par  \par  Hip Exam: No pain with internal or external rotation of hips bilaterally\par  \par  Special tests: Straight leg raise test negative.  Cross straight leg test negative.  PETER test negative\par  \par  Vascular: Examination of the peripheral vascular system demonstrates no evidence of congestion or edema. no evidence of lymphedema bilateral lower extremities, pulses are present and symmetric in both lower extremities. [de-identified] : X-ray AP lateral full spine lumbar flexion extension 1/3/2024 (my read) shows healed T12 fracture with normal alignment in that region, no additional fractures seen.  XR Full Spine AP/Lateral 12/28/2022 [my read]: Healed T12 fracture with normal alignment in that region.  XR 9/19/22 (my read) T12 compression fracture, unchanged  XR 7/6/22 (my read): T12 compression fracture, unchanged from 5/2022 mri  MRI lumbar spine [my read] 5/10/22: demonstrates acute compression fracture T12. There is also severe disc degenerative disease in L4/5. There is mild canal stenosis at that level.   XRay 4/13/22: L4-S1 moderate disc degeneration; no fractures; no instability; no significant scoliosis.

## 2024-05-28 ENCOUNTER — APPOINTMENT (OUTPATIENT)
Dept: ORTHOPEDIC SURGERY | Facility: CLINIC | Age: 59
End: 2024-05-28
Payer: COMMERCIAL

## 2024-05-30 ENCOUNTER — APPOINTMENT (OUTPATIENT)
Dept: ORTHOPEDIC SURGERY | Facility: CLINIC | Age: 59
End: 2024-05-30
Payer: COMMERCIAL

## 2024-05-30 DIAGNOSIS — M17.0 BILATERAL PRIMARY OSTEOARTHRITIS OF KNEE: ICD-10-CM

## 2024-05-30 PROCEDURE — 20610 DRAIN/INJ JOINT/BURSA W/O US: CPT | Mod: 50

## 2024-05-30 PROCEDURE — 99213 OFFICE O/P EST LOW 20 MIN: CPT | Mod: 25

## 2024-05-30 NOTE — HISTORY OF PRESENT ILLNESS
[de-identified] : Last Visit:12/27/2023 Reason: Bilateral knee pain - would like a cortisone injection in both knees  Pain: Right knee - 6/10 Left  knee 7/0  Symptoms: aching / stiffness

## 2024-05-30 NOTE — PHYSICAL EXAM
[de-identified] : Bilateral knee\par  \par  Constitutional: \par  The patient is healthy-appearing and in no apparent distress. \par  \par  Gait:\par  The patient ambulates with a normal gait and no limp.\par  \par  Cardiovascular System: \par  The capillary refill is less than 2 seconds. \par  \par  Skin: \par  There are no skin abnormalities.\par  \par  Bilateral Knee:\par   \par  Bony Palpation: \par  There is no tenderness of the medial joint line. \par  There is no tenderness of the lateral joint line.\par  There is tenderness of the medial femoral chondyle.\par  There is tenderness of the lateral femoral chondyle.\par  There is no tenderness of the tibial tubercle.\par  There is no tenderness of the superior patella.\par  There is no tenderness of the inferior patella.\par  There is tenderness of the medial patellar facet.\par  There is tenderness of the lateral patellar facet.\par  \par  Soft Tissue Palpation: \par  There is no tenderness of the medial retinaculum.\par  There is no tenderness of the lateral retinaculum.\par  There is no tenderness of the quadriceps tendon.\par  There is no tenderness of the patella tendon.\par  There is no tenderness of the ITB.\par  There is no tenderness of the pes anserine.\par  \par  Active Range of Motion: \par  The range of motion at the knee actively and passively is 3 - 125. \par  \par  Special Tests: \par  There is a negative Apley.\par  There is a negative Steinmanns. \par  There is a negative Lachman and Anterior Drawer.\par  There is a negative Posterior Drawer.  \par  There is no varus or valgus laxity.\par  \par  Strength: \par  There is 5/5 hip flexion and 5/5 knee flexion and extension.  \par  \par  Psychiatric: \par  The patient demonstrates a normal mood and affect and is active and alert.

## 2024-05-30 NOTE — PROCEDURE

## 2024-07-15 ENCOUNTER — APPOINTMENT (OUTPATIENT)
Dept: ORTHOPEDIC SURGERY | Facility: CLINIC | Age: 59
End: 2024-07-15

## 2024-07-18 ENCOUNTER — APPOINTMENT (OUTPATIENT)
Dept: ORTHOPEDIC SURGERY | Facility: CLINIC | Age: 59
End: 2024-07-18

## 2024-08-19 RX ORDER — HYALURONATE SODIUM 30 MG/2 ML
30 SYRINGE (ML) INTRAARTICULAR
Qty: 6 | Refills: 0 | Status: ACTIVE | OUTPATIENT
Start: 2024-08-19

## 2024-10-28 ENCOUNTER — APPOINTMENT (OUTPATIENT)
Dept: ORTHOPEDIC SURGERY | Facility: CLINIC | Age: 59
End: 2024-10-28
Payer: COMMERCIAL

## 2024-10-28 DIAGNOSIS — M17.0 BILATERAL PRIMARY OSTEOARTHRITIS OF KNEE: ICD-10-CM

## 2024-10-28 PROCEDURE — 20611 DRAIN/INJ JOINT/BURSA W/US: CPT | Mod: 50

## 2024-10-28 PROCEDURE — 99213 OFFICE O/P EST LOW 20 MIN: CPT | Mod: 25

## 2024-11-18 ENCOUNTER — APPOINTMENT (OUTPATIENT)
Dept: ORTHOPEDIC SURGERY | Facility: CLINIC | Age: 59
End: 2024-11-18
Payer: COMMERCIAL

## 2024-11-18 DIAGNOSIS — M17.0 BILATERAL PRIMARY OSTEOARTHRITIS OF KNEE: ICD-10-CM

## 2024-11-18 PROCEDURE — 20610 DRAIN/INJ JOINT/BURSA W/O US: CPT | Mod: 50

## 2024-11-18 PROCEDURE — 99213 OFFICE O/P EST LOW 20 MIN: CPT | Mod: 25

## 2025-04-21 ENCOUNTER — OUTPATIENT (OUTPATIENT)
Dept: OUTPATIENT SERVICES | Facility: HOSPITAL | Age: 60
LOS: 1 days | End: 2025-04-21
Payer: COMMERCIAL

## 2025-04-21 DIAGNOSIS — R07.9 CHEST PAIN, UNSPECIFIED: ICD-10-CM

## 2025-04-21 PROCEDURE — A9500: CPT

## 2025-04-21 PROCEDURE — 93018 CV STRESS TEST I&R ONLY: CPT

## 2025-04-21 PROCEDURE — 93016 CV STRESS TEST SUPVJ ONLY: CPT

## 2025-04-21 PROCEDURE — 78452 HT MUSCLE IMAGE SPECT MULT: CPT

## 2025-04-21 PROCEDURE — 78452 HT MUSCLE IMAGE SPECT MULT: CPT | Mod: 26

## 2025-04-21 PROCEDURE — 93017 CV STRESS TEST TRACING ONLY: CPT
